# Patient Record
Sex: FEMALE
[De-identification: names, ages, dates, MRNs, and addresses within clinical notes are randomized per-mention and may not be internally consistent; named-entity substitution may affect disease eponyms.]

---

## 2021-03-02 ENCOUNTER — NURSE TRIAGE (OUTPATIENT)
Dept: OTHER | Facility: CLINIC | Age: 56
End: 2021-03-02

## 2021-03-03 ENCOUNTER — NURSE TRIAGE (OUTPATIENT)
Dept: OTHER | Facility: CLINIC | Age: 56
End: 2021-03-03

## 2021-03-03 NOTE — TELEPHONE ENCOUNTER
Reason for Disposition   Medication questions    Protocols used: INFORMATION ONLY CALL - NO TRIAGE-ADULT-AH    Caller is calling regarding his wife, wife is present for call. He states he GI provider prescribed magnesium citrate, and he wants to know when pt can drink water or liquids. \" Advised that they call the MD and get specific instructions regarding this provider order. Caller refused triage at this time. States he has been unable to get in touch with the provider, advised pt to call pharmacist where her got the medication  or the on call provider for further instructions regarding this medication and instructions following medication intake.